# Patient Record
Sex: FEMALE | HISPANIC OR LATINO | URBAN - METROPOLITAN AREA
[De-identification: names, ages, dates, MRNs, and addresses within clinical notes are randomized per-mention and may not be internally consistent; named-entity substitution may affect disease eponyms.]

---

## 2022-11-10 ENCOUNTER — APPOINTMENT (RX ONLY)
Dept: URBAN - METROPOLITAN AREA CLINIC 98 | Facility: CLINIC | Age: 42
Setting detail: DERMATOLOGY
End: 2022-11-10

## 2022-11-10 DIAGNOSIS — L71.8 OTHER ROSACEA: ICD-10-CM

## 2022-11-10 DIAGNOSIS — L21.8 OTHER SEBORRHEIC DERMATITIS: ICD-10-CM

## 2022-11-10 PROCEDURE — ? PRESCRIPTION

## 2022-11-10 PROCEDURE — 99204 OFFICE O/P NEW MOD 45 MIN: CPT

## 2022-11-10 PROCEDURE — ? SEPARATE AND IDENTIFIABLE DOCUMENTATION

## 2022-11-10 PROCEDURE — ? TREATMENT REGIMEN

## 2022-11-10 PROCEDURE — ? COUNSELING

## 2022-11-10 RX ORDER — METRONIDAZOLE 7.5 MG/G
GEL TOPICAL
Qty: 45 | Refills: 0 | Status: ERX | COMMUNITY
Start: 2022-11-10

## 2022-11-10 RX ORDER — KETOCONAZOLE 20 MG/ML
SHAMPOO, SUSPENSION TOPICAL
Qty: 120 | Refills: 0 | Status: ERX | COMMUNITY
Start: 2022-11-10

## 2022-11-10 RX ORDER — DOXYCYCLINE 50 MG/1
CAPSULE ORAL
Qty: 60 | Refills: 0 | Status: ERX | COMMUNITY
Start: 2022-11-10

## 2022-11-10 RX ADMIN — METRONIDAZOLE: 7.5 GEL TOPICAL at 00:00

## 2022-11-10 RX ADMIN — KETOCONAZOLE: 20 SHAMPOO, SUSPENSION TOPICAL at 00:00

## 2022-11-10 RX ADMIN — DOXYCYCLINE: 50 CAPSULE ORAL at 00:00

## 2022-11-10 ASSESSMENT — LOCATION SIMPLE DESCRIPTION DERM
LOCATION SIMPLE: LEFT CHEEK
LOCATION SIMPLE: RIGHT CHEEK

## 2022-11-10 ASSESSMENT — LOCATION DETAILED DESCRIPTION DERM
LOCATION DETAILED: RIGHT INFERIOR CENTRAL MALAR CHEEK
LOCATION DETAILED: LEFT INFERIOR CENTRAL MALAR CHEEK

## 2022-11-10 ASSESSMENT — LOCATION ZONE DERM: LOCATION ZONE: FACE

## 2022-11-10 NOTE — PROCEDURE: TREATMENT REGIMEN
Initiate Treatment: metronidazole 0.75 % topical gel \\nSig: Apply to rosacea on face QAM\\n\\ndoxycycline monohydrate 50 mg capsule \\nSig: Take one tablet PO BID with food
Samples Given: La Roche Posay moisturizer with spf+
Detail Level: Zone
Initiate Treatment: ketoconazole 2 % shampoo \\nSig: Apply to scalp Q72H, leave on for 5 minutes then rinse

## 2022-11-18 ENCOUNTER — RX ONLY (OUTPATIENT)
Age: 42
Setting detail: RX ONLY
End: 2022-11-18

## 2022-11-18 RX ORDER — FLUOCINOLONE ACETONIDE 0.11 MG/ML
OIL TOPICAL
Qty: 118.28 | Refills: 0 | Status: ERX | COMMUNITY
Start: 2022-11-18